# Patient Record
(demographics unavailable — no encounter records)

---

## 2025-07-23 NOTE — IMAGING
[de-identified] : Spine: Inspection/Palpation: No tenderness to palpation throughout Cervical/thoracic/lumbar spine. No bony stepoffs, No lesions.   Gait: Non-antalgic, able to perform bilateral toe and heel rise.   Range of Motion: Cervical Spine: Flexion to chin to chest, extension to 70 degrees,     Neurologic: Bilateral upper extremities 5/5 Deltoid/Biceps/Triceps/ Wrist Flexion/Wrist Extension/ / Intrinsics Except   Sensation intact to light touch C5-T1        Negative Stevens's,     X-ray Ap/Lateral/Flexion/Extension of cervical spine were viewed and interpreted.   multilevel degen changes with spondylosis . C4-5 and c5-6 spondylolisthesis.  No instability on flexion or extension views

## 2025-07-23 NOTE — HISTORY OF PRESENT ILLNESS
[Neck] : neck [de-identified] : 07/23/2025: MERNA DORMAN is a 66-year-old male here for neck pain. He states that he's has neck pain for a couple of weeks now. Denies seeking prior treatment. Pain is worse at night and can keep him up. During the day, pain is dull. There is stiffness in the mornings. Pain can radiate into the R shoulder. Pain worse at night. Takes Advil intermittently with some relief but not supposed to take due to CKD.  Pain is all on right.  Ice helps heat does not . At times will pain radiate to right trap.     PMH: CKD